# Patient Record
Sex: MALE | Race: OTHER | HISPANIC OR LATINO | URBAN - METROPOLITAN AREA
[De-identification: names, ages, dates, MRNs, and addresses within clinical notes are randomized per-mention and may not be internally consistent; named-entity substitution may affect disease eponyms.]

---

## 2017-10-17 ENCOUNTER — INPATIENT (INPATIENT)
Facility: HOSPITAL | Age: 42
LOS: 0 days | Discharge: ANOTHER HOSPITAL | DRG: 282 | End: 2017-10-18
Attending: INTERNAL MEDICINE | Admitting: INTERNAL MEDICINE
Payer: COMMERCIAL

## 2017-10-17 ENCOUNTER — TRANSCRIPTION ENCOUNTER (OUTPATIENT)
Age: 42
End: 2017-10-17

## 2017-10-17 VITALS
WEIGHT: 154.98 LBS | OXYGEN SATURATION: 99 % | TEMPERATURE: 98 F | HEIGHT: 65 IN | HEART RATE: 78 BPM | DIASTOLIC BLOOD PRESSURE: 100 MMHG | SYSTOLIC BLOOD PRESSURE: 145 MMHG | RESPIRATION RATE: 16 BRPM

## 2017-10-17 DIAGNOSIS — I21.4 NON-ST ELEVATION (NSTEMI) MYOCARDIAL INFARCTION: ICD-10-CM

## 2017-10-17 DIAGNOSIS — Z29.9 ENCOUNTER FOR PROPHYLACTIC MEASURES, UNSPECIFIED: ICD-10-CM

## 2017-10-17 DIAGNOSIS — I24.9 ACUTE ISCHEMIC HEART DISEASE, UNSPECIFIED: ICD-10-CM

## 2017-10-17 LAB
ALBUMIN SERPL ELPH-MCNC: 3.5 G/DL — SIGNIFICANT CHANGE UP (ref 3.5–5)
ALP SERPL-CCNC: 127 U/L — HIGH (ref 40–120)
ALT FLD-CCNC: 42 U/L DA — SIGNIFICANT CHANGE UP (ref 10–60)
ANION GAP SERPL CALC-SCNC: 6 MMOL/L — SIGNIFICANT CHANGE UP (ref 5–17)
AST SERPL-CCNC: 34 U/L — SIGNIFICANT CHANGE UP (ref 10–40)
BASOPHILS # BLD AUTO: 0.1 K/UL — SIGNIFICANT CHANGE UP (ref 0–0.2)
BASOPHILS NFR BLD AUTO: 0.9 % — SIGNIFICANT CHANGE UP (ref 0–2)
BILIRUB SERPL-MCNC: 0.4 MG/DL — SIGNIFICANT CHANGE UP (ref 0.2–1.2)
BUN SERPL-MCNC: 10 MG/DL — SIGNIFICANT CHANGE UP (ref 7–18)
CALCIUM SERPL-MCNC: 9.1 MG/DL — SIGNIFICANT CHANGE UP (ref 8.4–10.5)
CHLORIDE SERPL-SCNC: 103 MMOL/L — SIGNIFICANT CHANGE UP (ref 96–108)
CK SERPL-CCNC: 229 U/L — SIGNIFICANT CHANGE UP (ref 35–232)
CO2 SERPL-SCNC: 28 MMOL/L — SIGNIFICANT CHANGE UP (ref 22–31)
CREAT SERPL-MCNC: 0.77 MG/DL — SIGNIFICANT CHANGE UP (ref 0.5–1.3)
EOSINOPHIL # BLD AUTO: 0.3 K/UL — SIGNIFICANT CHANGE UP (ref 0–0.5)
EOSINOPHIL NFR BLD AUTO: 2.2 % — SIGNIFICANT CHANGE UP (ref 0–6)
GLUCOSE SERPL-MCNC: 79 MG/DL — SIGNIFICANT CHANGE UP (ref 70–99)
HCT VFR BLD CALC: 49.1 % — SIGNIFICANT CHANGE UP (ref 39–50)
HGB BLD-MCNC: 15.8 G/DL — SIGNIFICANT CHANGE UP (ref 13–17)
LYMPHOCYTES # BLD AUTO: 3.9 K/UL — HIGH (ref 1–3.3)
LYMPHOCYTES # BLD AUTO: 33.3 % — SIGNIFICANT CHANGE UP (ref 13–44)
MCHC RBC-ENTMCNC: 29.7 PG — SIGNIFICANT CHANGE UP (ref 27–34)
MCHC RBC-ENTMCNC: 32.3 GM/DL — SIGNIFICANT CHANGE UP (ref 32–36)
MCV RBC AUTO: 91.9 FL — SIGNIFICANT CHANGE UP (ref 80–100)
MONOCYTES # BLD AUTO: 0.4 K/UL — SIGNIFICANT CHANGE UP (ref 0–0.9)
MONOCYTES NFR BLD AUTO: 3.8 % — SIGNIFICANT CHANGE UP (ref 2–14)
NEUTROPHILS # BLD AUTO: 7 K/UL — SIGNIFICANT CHANGE UP (ref 1.8–7.4)
NEUTROPHILS NFR BLD AUTO: 59.8 % — SIGNIFICANT CHANGE UP (ref 43–77)
PLATELET # BLD AUTO: 309 K/UL — SIGNIFICANT CHANGE UP (ref 150–400)
POTASSIUM SERPL-MCNC: 3.6 MMOL/L — SIGNIFICANT CHANGE UP (ref 3.5–5.3)
POTASSIUM SERPL-SCNC: 3.6 MMOL/L — SIGNIFICANT CHANGE UP (ref 3.5–5.3)
PROT SERPL-MCNC: 7.9 G/DL — SIGNIFICANT CHANGE UP (ref 6–8.3)
RBC # BLD: 5.34 M/UL — SIGNIFICANT CHANGE UP (ref 4.2–5.8)
RBC # FLD: 12.5 % — SIGNIFICANT CHANGE UP (ref 10.3–14.5)
SODIUM SERPL-SCNC: 137 MMOL/L — SIGNIFICANT CHANGE UP (ref 135–145)
TROPONIN I SERPL-MCNC: 4.65 NG/ML — HIGH (ref 0–0.04)
WBC # BLD: 11.6 K/UL — HIGH (ref 3.8–10.5)
WBC # FLD AUTO: 11.6 K/UL — HIGH (ref 3.8–10.5)

## 2017-10-17 PROCEDURE — 71020: CPT | Mod: 26

## 2017-10-17 PROCEDURE — 99285 EMERGENCY DEPT VISIT HI MDM: CPT

## 2017-10-17 RX ORDER — ASPIRIN/CALCIUM CARB/MAGNESIUM 324 MG
325 TABLET ORAL ONCE
Qty: 0 | Refills: 0 | Status: COMPLETED | OUTPATIENT
Start: 2017-10-17 | End: 2017-10-17

## 2017-10-17 RX ORDER — METOPROLOL TARTRATE 50 MG
25 TABLET ORAL
Qty: 0 | Refills: 0 | Status: DISCONTINUED | OUTPATIENT
Start: 2017-10-17 | End: 2017-10-18

## 2017-10-17 RX ORDER — NITROGLYCERIN 6.5 MG
0.3 CAPSULE, EXTENDED RELEASE ORAL
Qty: 0 | Refills: 0 | Status: DISCONTINUED | OUTPATIENT
Start: 2017-10-17 | End: 2017-10-18

## 2017-10-17 RX ORDER — NICOTINE POLACRILEX 2 MG
1 GUM BUCCAL DAILY
Qty: 0 | Refills: 0 | Status: DISCONTINUED | OUTPATIENT
Start: 2017-10-17 | End: 2017-10-18

## 2017-10-17 RX ORDER — NITROGLYCERIN 6.5 MG
1 CAPSULE, EXTENDED RELEASE ORAL ONCE
Qty: 0 | Refills: 0 | Status: COMPLETED | OUTPATIENT
Start: 2017-10-17 | End: 2017-10-17

## 2017-10-17 RX ORDER — ENOXAPARIN SODIUM 100 MG/ML
60 INJECTION SUBCUTANEOUS ONCE
Qty: 0 | Refills: 0 | Status: COMPLETED | OUTPATIENT
Start: 2017-10-17 | End: 2017-10-17

## 2017-10-17 RX ORDER — CLOPIDOGREL BISULFATE 75 MG/1
75 TABLET, FILM COATED ORAL DAILY
Qty: 0 | Refills: 0 | Status: DISCONTINUED | OUTPATIENT
Start: 2017-10-17 | End: 2017-10-18

## 2017-10-17 RX ORDER — ATORVASTATIN CALCIUM 80 MG/1
40 TABLET, FILM COATED ORAL AT BEDTIME
Qty: 0 | Refills: 0 | Status: DISCONTINUED | OUTPATIENT
Start: 2017-10-17 | End: 2017-10-18

## 2017-10-17 RX ADMIN — ATORVASTATIN CALCIUM 40 MILLIGRAM(S): 80 TABLET, FILM COATED ORAL at 23:11

## 2017-10-17 RX ADMIN — ENOXAPARIN SODIUM 60 MILLIGRAM(S): 100 INJECTION SUBCUTANEOUS at 21:30

## 2017-10-17 RX ADMIN — Medication 325 MILLIGRAM(S): at 20:58

## 2017-10-17 RX ADMIN — CLOPIDOGREL BISULFATE 75 MILLIGRAM(S): 75 TABLET, FILM COATED ORAL at 23:11

## 2017-10-17 RX ADMIN — Medication 25 MILLIGRAM(S): at 23:11

## 2017-10-17 RX ADMIN — Medication 325 MILLIGRAM(S): at 21:30

## 2017-10-17 RX ADMIN — Medication 1 INCH(S): at 21:30

## 2017-10-17 NOTE — H&P ADULT - NSHPPHYSICALEXAM_GEN_ALL_CORE
GENERAL: middle aged man, no acute distress  HEAD: atraumatic, normocephalic   EYES: PERRLA, white sclera.   ENMT: nasal mucosa- Oral cavity- WNL  NECK: supple, JVD/ no JVD, thyroid- WNL  LYMPH: no palpable lymph nodes     SKIN: warm and dry  CHEST/LUNG: No Chest deformity , no chest tenderness. bilateral breath sounds,no  adventitious sounds  HEART: RRR, no m/r/g   ABDOMEN: soft, nontender, nondistended; bowel sounds.+  EXTREMITIES:  no pedal edema, no cyanosis, no clubbing.  NEURO: AA0X 3

## 2017-10-17 NOTE — H&P ADULT - ASSESSMENT
Patient is a 43 y/o M pt w/ no significant PMHx presents to ED c/o intermittent substernal chest pressure x3 days.  Patient has stable vitals in the ED, L:Abs WNL except T1 elevation to 4.6, with EKG showing TWI in Lead II, III and AVF, given ASA, plavix and full dose lovenox in the ED  Admitted for NSTEMI

## 2017-10-17 NOTE — CONSULT NOTE ADULT - PROBLEM SELECTOR RECOMMENDATION 9
- FARZANEH score: 1  - EKG shows TWI in inferior leads  - troponin 4.6; continue to trend cardiac enzymes   - s/p aspirin, plavix, full dose lovenox  - admit to telemetry  - continue plavix, atorvastatin, metoprolol  - can give morphine for pain as needed  - transfer for cardiac catheterization tomorrow

## 2017-10-17 NOTE — H&P ADULT - PROBLEM SELECTOR PLAN 1
Patient with typical chest pain, TWI in inferior leads and T1 elevation to 4.65  Admitted for NSTEMI. given ASA< plavix, lovenox  add BB, Lipitor, get TTE  check A1C, lipid profile  FARZANEH score: 1  Plan for transfer for cardiac CATH in AM  Cardio: Dr Poon

## 2017-10-17 NOTE — CONSULT NOTE ADULT - SUBJECTIVE AND OBJECTIVE BOX
Patient is a 42 year-old male who presents with a chief complaint of chest pain (17 Oct 2017 22:04)      Initial HPI on admission:  HPI:  Patient is a 42 year-old male with no PMH who presents to ED c/o intermittent substernal chest pressure x3 days. Pt states that his pain radiates to his jaw and b/l upper extremities and is associated w/ nausea and intermittet diaphoresis. Pain is substernal, pressure like, intermittent 7/10, and also happens at rest, not related to food or not started after unusual exercise. Pt reports that he has been taking Aleve to improve pain. Pt denies vomiting, SOB, or any other complaints. He is a current everyday smoker, 1PPD for last 18 years, no alcohol, FHx of MI in grand father, Surgical Hx of cholecystectomy, denies drug allergies.     BRIEF HOSPITAL COURSE: EKG shows TWI in II, III, AVF with first troponin 4.6. Patient's chest pain has improved. Given aspirin, plavix, and full dose lovenox in the ED. ICU consulted for NSTEMI. Patient is for transfer for cardiac catheterization tomorrow morning.     PAST MEDICAL & SURGICAL HISTORY:  Gallstones  No significant past surgical history    Allergies    Advil (Hives; Rash)  Motrin (Hives; Rash)      FAMILY HISTORY: MI in grandfather at age 80    Social history reviewed: current smoker, 1 pack pack per day     Review of Systems:  CONSTITUTIONAL: No fever, chills, or fatigue; DIAPHORESS, NAUSEA YESTERDAY  EYES: No eye pain, visual disturbances, or discharge  ENMT:  No difficulty hearing, tinnitus, vertigo; No sinus or throat pain  NECK: No pain or stiffness  RESPIRATORY: No cough, wheezing, chills or hemoptysis; No shortness of breath  CARDIOVASCULAR: SUBSTERNAL CHEST PAIN RADIATING TO BOTH ARMS  GASTROINTESTINAL: No abdominal or epigastric pain. No vomiting, or hematemesis; No diarrhea or constipation. No melena or hematochezia.  GENITOURINARY: No dysuria, frequency, hematuria, or incontinence  NEUROLOGICAL: No headaches, memory loss, loss of strength, numbness, or tremors  SKIN: No itching, burning, rashes, or lesions   MUSCULOSKELETAL: No joint pain or swelling; No muscle, back, or extremity pain  PSYCHIATRIC: No depression, anxiety, mood swings, or difficulty sleeping      Medications:  atorvastatin 40 milliGRAM(s) Oral at bedtime  clopidogrel Tablet 75 milliGRAM(s) Oral daily  metoprolol 25 milliGRAM(s) Oral two times a day  nicotine -  14 mG/24Hr(s) Patch 1 patch Transdermal daily  nitroglycerin     SubLingual 0.3 milliGRAM(s) SubLingual every 5 minutes PRN      Vital Signs Last 24 Hrs  T(C): 36.6 (17 Oct 2017 18:14), Max: 36.6 (17 Oct 2017 18:14)  T(F): 97.8 (17 Oct 2017 18:14), Max: 97.8 (17 Oct 2017 18:14)  HR: 78 (17 Oct 2017 18:14) (78 - 78)  BP: 145/100 (17 Oct 2017 18:14) (145/100 - 145/100)  BP(mean): --  RR: 16 (17 Oct 2017 18:14) (16 - 16)  SpO2: 99% (17 Oct 2017 18:14) (99% - 99%)      LABS:                        15.8   11.6  )-----------( 309      ( 17 Oct 2017 20:39 )             49.1     10-17    137  |  103  |  10  ----------------------------<  79  3.6   |  28  |  0.77    Ca    9.1      17 Oct 2017 20:39    TPro  7.9  /  Alb  3.5  /  TBili  0.4  /  DBili  x   /  AST  34  /  ALT  42  /  AlkPhos  127<H>  10-17      CARDIAC MARKERS ( 17 Oct 2017 20:39 )  4.650 ng/mL / x     / 229 U/L / x     / x          Physical Examination:    General: No distress; no diaphoresis    HEENT: PERRLA    PULM: Clear to auscultation bilaterally, respirations non-labored     CVS: Regular rate and rhythm, no murmurs, rubs, or gallops    ABD: Soft, nondistended, nontender, normoactive bowel sounds in all 4 quadrants     EXT: No edema, nontender    SKIN: Warm and well perfused, no rashes noted.    NEURO: Alert and oriented times 3; no focal deficits     RADIOLOGY REVIEWED:     CRITICAL CARE TIME SPENT: 35 minutes

## 2017-10-17 NOTE — ED PROVIDER NOTE - OBJECTIVE STATEMENT
43 y/o M pt w/ no significant PMHx presents to ED c/o intermittent substernal chest pressure x3 days. Pt states that his pain radiates to his neck and upper extremities and is associated w/ nausea. Pt reports that he has been taking Aleve to improve pain. Pt denies vomiting, SOB, or any other complaints. Pt's risk factors include smoking. Pt is allergic to multiple drugs as listed.

## 2017-10-17 NOTE — CONSULT NOTE ADULT - ASSESSMENT
Patient is a 42 year-old male, current smoker, who presents with typical chest pain and is admitted for NSTEMI. Patient is for cardiac catheterization tomorrow. Does not require ICU admission at this time.

## 2017-10-17 NOTE — H&P ADULT - HISTORY OF PRESENT ILLNESS
Patient is a 41 y/o M pt w/ no significant PMHx presents to ED c/o intermittent substernal chest pressure x3 days. Pt states that his pain radiates to his jaw and b/l upper extremities and is associated w/ nausea and intermittet diaphoresis. Pain is substernal, pressure like, intermittent 7/10, and also happens at rest, not related to food or not started after unusual exercise. Pt reports that he has been taking Aleve to improve pain. Pt denies vomiting, SOB, or any other complaints. He is a current everyday smoker, 1PPD for last 18 years, no alcohol, FHx of MI in grand father, Surgical Hx of cholecystectomy, denies drug allergies.

## 2017-10-18 ENCOUNTER — TRANSCRIPTION ENCOUNTER (OUTPATIENT)
Age: 42
End: 2017-10-18

## 2017-10-18 ENCOUNTER — INPATIENT (INPATIENT)
Facility: HOSPITAL | Age: 42
LOS: 0 days | Discharge: HOME | DRG: 247 | End: 2017-10-19
Attending: INTERNAL MEDICINE | Admitting: INTERNAL MEDICINE
Payer: COMMERCIAL

## 2017-10-18 VITALS
SYSTOLIC BLOOD PRESSURE: 106 MMHG | RESPIRATION RATE: 18 BRPM | DIASTOLIC BLOOD PRESSURE: 65 MMHG | OXYGEN SATURATION: 100 % | TEMPERATURE: 98 F | HEART RATE: 77 BPM

## 2017-10-18 VITALS
HEIGHT: 65 IN | WEIGHT: 158.07 LBS | TEMPERATURE: 98 F | DIASTOLIC BLOOD PRESSURE: 84 MMHG | SYSTOLIC BLOOD PRESSURE: 167 MMHG | OXYGEN SATURATION: 98 % | HEART RATE: 67 BPM | RESPIRATION RATE: 16 BRPM

## 2017-10-18 DIAGNOSIS — R07.89 OTHER CHEST PAIN: ICD-10-CM

## 2017-10-18 DIAGNOSIS — Z90.49 ACQUIRED ABSENCE OF OTHER SPECIFIED PARTS OF DIGESTIVE TRACT: Chronic | ICD-10-CM

## 2017-10-18 LAB
ALBUMIN SERPL ELPH-MCNC: 3.5 G/DL — SIGNIFICANT CHANGE UP (ref 3.5–5)
ALP SERPL-CCNC: 126 U/L — HIGH (ref 40–120)
ALT FLD-CCNC: 42 U/L DA — SIGNIFICANT CHANGE UP (ref 10–60)
ANION GAP SERPL CALC-SCNC: 5 MMOL/L — SIGNIFICANT CHANGE UP (ref 5–17)
AST SERPL-CCNC: 37 U/L — SIGNIFICANT CHANGE UP (ref 10–40)
BILIRUB DIRECT SERPL-MCNC: 0.1 MG/DL — SIGNIFICANT CHANGE UP (ref 0–0.2)
BILIRUB INDIRECT FLD-MCNC: 0.4 MG/DL — SIGNIFICANT CHANGE UP (ref 0.2–1)
BILIRUB SERPL-MCNC: 0.5 MG/DL — SIGNIFICANT CHANGE UP (ref 0.2–1.2)
BUN SERPL-MCNC: 11 MG/DL — SIGNIFICANT CHANGE UP (ref 7–18)
CALCIUM SERPL-MCNC: 8.6 MG/DL — SIGNIFICANT CHANGE UP (ref 8.4–10.5)
CHLORIDE SERPL-SCNC: 103 MMOL/L — SIGNIFICANT CHANGE UP (ref 96–108)
CHOLEST SERPL-MCNC: 201 MG/DL — HIGH (ref 10–199)
CK MB BLD-MCNC: 2.4 % — SIGNIFICANT CHANGE UP (ref 0–3.5)
CK MB BLD-MCNC: 2.8 % — SIGNIFICANT CHANGE UP (ref 0–3.5)
CK MB CFR SERPL CALC: 3.7 NG/ML — HIGH (ref 0–3.6)
CK MB CFR SERPL CALC: 5.4 NG/ML — HIGH (ref 0–3.6)
CK SERPL-CCNC: 154 U/L — SIGNIFICANT CHANGE UP (ref 35–232)
CK SERPL-CCNC: 194 U/L — SIGNIFICANT CHANGE UP (ref 35–232)
CO2 SERPL-SCNC: 30 MMOL/L — SIGNIFICANT CHANGE UP (ref 22–31)
CREAT SERPL-MCNC: 0.7 MG/DL — SIGNIFICANT CHANGE UP (ref 0.5–1.3)
GLUCOSE SERPL-MCNC: 121 MG/DL — HIGH (ref 70–99)
HBA1C BLD-MCNC: 5.4 % — SIGNIFICANT CHANGE UP (ref 4–5.6)
HCT VFR BLD CALC: 49.7 % — SIGNIFICANT CHANGE UP (ref 39–50)
HDLC SERPL-MCNC: 26 MG/DL — LOW (ref 40–125)
HGB BLD-MCNC: 16.3 G/DL — SIGNIFICANT CHANGE UP (ref 13–17)
LIPID PNL WITH DIRECT LDL SERPL: 72 MG/DL — SIGNIFICANT CHANGE UP
MAGNESIUM SERPL-MCNC: 2 MG/DL — SIGNIFICANT CHANGE UP (ref 1.6–2.6)
MCHC RBC-ENTMCNC: 29.9 PG — SIGNIFICANT CHANGE UP (ref 27–34)
MCHC RBC-ENTMCNC: 32.7 GM/DL — SIGNIFICANT CHANGE UP (ref 32–36)
MCV RBC AUTO: 91.4 FL — SIGNIFICANT CHANGE UP (ref 80–100)
PHOSPHATE SERPL-MCNC: 3.4 MG/DL — SIGNIFICANT CHANGE UP (ref 2.5–4.5)
PLATELET # BLD AUTO: 299 K/UL — SIGNIFICANT CHANGE UP (ref 150–400)
POTASSIUM SERPL-MCNC: 3.7 MMOL/L — SIGNIFICANT CHANGE UP (ref 3.5–5.3)
POTASSIUM SERPL-SCNC: 3.7 MMOL/L — SIGNIFICANT CHANGE UP (ref 3.5–5.3)
PROT SERPL-MCNC: 7.7 G/DL — SIGNIFICANT CHANGE UP (ref 6–8.3)
RBC # BLD: 5.44 M/UL — SIGNIFICANT CHANGE UP (ref 4.2–5.8)
RBC # FLD: 12.4 % — SIGNIFICANT CHANGE UP (ref 10.3–14.5)
SODIUM SERPL-SCNC: 138 MMOL/L — SIGNIFICANT CHANGE UP (ref 135–145)
TOTAL CHOLESTEROL/HDL RATIO MEASUREMENT: 7.7 RATIO — SIGNIFICANT CHANGE UP (ref 3.4–9.6)
TRIGL SERPL-MCNC: 516 MG/DL — HIGH (ref 10–149)
TROPONIN I SERPL-MCNC: 4.59 NG/ML — HIGH (ref 0–0.04)
TROPONIN I SERPL-MCNC: 5.19 NG/ML — HIGH (ref 0–0.04)
TSH SERPL-MCNC: 2.51 UU/ML — SIGNIFICANT CHANGE UP (ref 0.34–4.82)
VIT B12 SERPL-MCNC: 693 PG/ML — SIGNIFICANT CHANGE UP (ref 243–894)
WBC # BLD: 10 K/UL — SIGNIFICANT CHANGE UP (ref 3.8–10.5)
WBC # FLD AUTO: 10 K/UL — SIGNIFICANT CHANGE UP (ref 3.8–10.5)

## 2017-10-18 PROCEDURE — 84484 ASSAY OF TROPONIN QUANT: CPT

## 2017-10-18 PROCEDURE — 82550 ASSAY OF CK (CPK): CPT

## 2017-10-18 PROCEDURE — 82607 VITAMIN B-12: CPT

## 2017-10-18 PROCEDURE — 93010 ELECTROCARDIOGRAM REPORT: CPT

## 2017-10-18 PROCEDURE — 82553 CREATINE MB FRACTION: CPT

## 2017-10-18 PROCEDURE — 84100 ASSAY OF PHOSPHORUS: CPT

## 2017-10-18 PROCEDURE — 71046 X-RAY EXAM CHEST 2 VIEWS: CPT

## 2017-10-18 PROCEDURE — 93306 TTE W/DOPPLER COMPLETE: CPT

## 2017-10-18 PROCEDURE — 85027 COMPLETE CBC AUTOMATED: CPT

## 2017-10-18 PROCEDURE — 80061 LIPID PANEL: CPT

## 2017-10-18 PROCEDURE — 80053 COMPREHEN METABOLIC PANEL: CPT

## 2017-10-18 PROCEDURE — 80048 BASIC METABOLIC PNL TOTAL CA: CPT

## 2017-10-18 PROCEDURE — 84443 ASSAY THYROID STIM HORMONE: CPT

## 2017-10-18 PROCEDURE — 99285 EMERGENCY DEPT VISIT HI MDM: CPT | Mod: 25

## 2017-10-18 PROCEDURE — 83036 HEMOGLOBIN GLYCOSYLATED A1C: CPT

## 2017-10-18 PROCEDURE — 83735 ASSAY OF MAGNESIUM: CPT

## 2017-10-18 PROCEDURE — 80076 HEPATIC FUNCTION PANEL: CPT

## 2017-10-18 PROCEDURE — 93005 ELECTROCARDIOGRAM TRACING: CPT

## 2017-10-18 RX ORDER — ASPIRIN/CALCIUM CARB/MAGNESIUM 324 MG
1 TABLET ORAL
Qty: 0 | Refills: 0 | COMMUNITY
Start: 2017-10-18

## 2017-10-18 RX ORDER — ATORVASTATIN CALCIUM 80 MG/1
1 TABLET, FILM COATED ORAL
Qty: 0 | Refills: 0 | COMMUNITY
Start: 2017-10-18

## 2017-10-18 RX ORDER — ATORVASTATIN CALCIUM 80 MG/1
1 TABLET, FILM COATED ORAL
Qty: 90 | Refills: 3 | OUTPATIENT
Start: 2017-10-18 | End: 2018-10-12

## 2017-10-18 RX ORDER — NITROGLYCERIN 6.5 MG
0 CAPSULE, EXTENDED RELEASE ORAL
Qty: 0 | Refills: 0 | COMMUNITY
Start: 2017-10-18

## 2017-10-18 RX ORDER — CLOPIDOGREL BISULFATE 75 MG/1
1 TABLET, FILM COATED ORAL
Qty: 90 | Refills: 3 | OUTPATIENT
Start: 2017-10-18 | End: 2018-10-12

## 2017-10-18 RX ORDER — CLOPIDOGREL BISULFATE 75 MG/1
75 TABLET, FILM COATED ORAL DAILY
Qty: 0 | Refills: 0 | Status: DISCONTINUED | OUTPATIENT
Start: 2017-10-18 | End: 2017-10-19

## 2017-10-18 RX ORDER — NICOTINE POLACRILEX 2 MG
0 GUM BUCCAL
Qty: 0 | Refills: 0 | COMMUNITY
Start: 2017-10-18

## 2017-10-18 RX ORDER — CLOPIDOGREL BISULFATE 75 MG/1
1 TABLET, FILM COATED ORAL
Qty: 0 | Refills: 0 | COMMUNITY
Start: 2017-10-18

## 2017-10-18 RX ORDER — ATORVASTATIN CALCIUM 80 MG/1
40 TABLET, FILM COATED ORAL AT BEDTIME
Qty: 0 | Refills: 0 | Status: DISCONTINUED | OUTPATIENT
Start: 2017-10-18 | End: 2017-10-19

## 2017-10-18 RX ORDER — METOPROLOL TARTRATE 50 MG
25 TABLET ORAL
Qty: 0 | Refills: 0 | Status: DISCONTINUED | OUTPATIENT
Start: 2017-10-18 | End: 2017-10-19

## 2017-10-18 RX ORDER — ASPIRIN/CALCIUM CARB/MAGNESIUM 324 MG
81 TABLET ORAL DAILY
Qty: 0 | Refills: 0 | Status: DISCONTINUED | OUTPATIENT
Start: 2017-10-18 | End: 2017-10-19

## 2017-10-18 RX ORDER — NICOTINE POLACRILEX 2 MG
1 GUM BUCCAL DAILY
Qty: 0 | Refills: 0 | Status: DISCONTINUED | OUTPATIENT
Start: 2017-10-18 | End: 2017-10-19

## 2017-10-18 RX ORDER — METOPROLOL TARTRATE 50 MG
1 TABLET ORAL
Qty: 120 | Refills: 0 | OUTPATIENT
Start: 2017-10-18 | End: 2017-12-17

## 2017-10-18 RX ORDER — METOPROLOL TARTRATE 50 MG
1 TABLET ORAL
Qty: 0 | Refills: 0 | COMMUNITY
Start: 2017-10-18

## 2017-10-18 RX ORDER — ASPIRIN/CALCIUM CARB/MAGNESIUM 324 MG
81 TABLET ORAL DAILY
Qty: 0 | Refills: 0 | Status: DISCONTINUED | OUTPATIENT
Start: 2017-10-18 | End: 2017-10-18

## 2017-10-18 RX ADMIN — Medication 25 MILLIGRAM(S): at 17:33

## 2017-10-18 RX ADMIN — Medication 1 PATCH: at 17:33

## 2017-10-18 RX ADMIN — Medication 81 MILLIGRAM(S): at 08:52

## 2017-10-18 RX ADMIN — ATORVASTATIN CALCIUM 40 MILLIGRAM(S): 80 TABLET, FILM COATED ORAL at 21:23

## 2017-10-18 NOTE — DISCHARGE NOTE ADULT - MEDICATION SUMMARY - MEDICATIONS TO TAKE
I will START or STAY ON the medications listed below when I get home from the hospital:    aspirin 81 mg oral delayed release tablet  -- 1 tab(s) by mouth once a day  -- Indication: For To keep stent patent     atorvastatin 40 mg oral tablet  -- 1 tab(s) by mouth once a day (at bedtime)  hospital  -- Indication: For Hyperlipidemia     clopidogrel 75 mg oral tablet  -- 1 tab(s) by mouth once a day  hospital  -- Indication: For To keep stent patent     metoprolol tartrate 25 mg oral tablet  -- 1 tab(s) by mouth 2 times a day  hospital  -- Indication: For Hypertension

## 2017-10-18 NOTE — DISCHARGE NOTE ADULT - CARE PLAN
Principal Discharge DX:	NSTEMI (non-ST elevated myocardial infarction)  Goal:	cardiac CATH  Instructions for follow-up, activity and diet:	you presented with typical chest pain and were diagnosed of non ST elevation myocardial infarction and need cardiac angiogram  you are being transferred to [facility] for the same

## 2017-10-18 NOTE — CONSULT NOTE ADULT - ASSESSMENT
Pt with Hx of Smoking,family hx of CAD,hyperlipidemia here with non-stemi.  1.D/W pt and his wife agree for cardiac cath this am.  2.Smoking cessation.  3.Continue ASA,Plavix,lopressor,statin.  4.GI and DVT prophylaxis.

## 2017-10-18 NOTE — DISCHARGE NOTE ADULT - HOSPITAL COURSE
Patient is a 43 y/o M pt w/ no significant PMHx presents to ED c/o intermittent substernal chest pressure x3 days.  Patient has stable vitals in the ED, L:Abs WNL except T1 elevation to 4.6, with EKG showing TWI in Lead II, III and AVF, given ASA, plavix and full dose lovenox in the ED  Patient with typical chest pain, TWI in inferior leads and T1 elevation to 4.65  Admitted for NSTEMI. given ASA< plavix, lovenox  add BB, Lipitor, get TTE  check A1C, lipid profile  FARZANEH score: 1  Plan for transfer for cardiac CATH to [place]  Cardio: Dr Poon.   Stable for transfer as per medical attending

## 2017-10-18 NOTE — DISCHARGE NOTE ADULT - PLAN OF CARE
Pt remains chest pain free and understands post cath discharge instructions No heavy lifting, strenuous activity, bending, straining or unnecessary stair climbing  for 2 weeks. No sex for 1 week.  No driving for 2 days. You may shower 24 hours following procedure but avoid baths and swimming for 1 week. Check groin site for bleeding and/or swelling daily following procedure. Call your doctor/cardiologist immediately should it occur or if you have increased/persistent pain at the site. Follow up with your cardiologist in 1- 2 weeks. You may call [facility] at [phone] or [phone] after office hours and weekends  with any questions or concerns following your procedure. Take medications as prescribed. Your LDL cholesterol will be less than 70mg/dL Continue with your cholesterol medications. Eat a heart healthy diet that is low in saturated fats and salt, and includes whole grains, fruits, vegetables and lean protein; exercise regularly (consult with your physician or cardiologist first); maintain a heart healthy weight. Continue to follow with your primary physician or cardiologist for treatment goals, continue medication, have liver function testing every 3 months as anti lipid medications can cause liver irritation. If you smoke - quit (A resource to help you stop smoking is the [facility] – phone number [phone].).

## 2017-10-18 NOTE — DISCHARGE NOTE ADULT - NS AS DC AMI ACE CONTRA
Patient for CATH in AM/other reasons documented by Physician / Nurse Practitioner / Physician Assistant  or Pharmacist for not prescribing an ACEI AND not prescibing an ARB at discharge

## 2017-10-18 NOTE — DISCHARGE NOTE ADULT - PATIENT PORTAL LINK FT
“You can access the [employer] Patient Portal, offered by [facility], by registering with the following website: [email or web address]”

## 2017-10-18 NOTE — DISCHARGE NOTE ADULT - PLAN OF CARE
cardiac CATH you presented with typical chest pain and were diagnosed of non ST elevation myocardial infarction and need cardiac angiogram  you are being transferred to [facility] for the same

## 2017-10-18 NOTE — DISCHARGE NOTE ADULT - MEDICATION SUMMARY - MEDICATIONS TO TAKE
I will START or STAY ON the medications listed below when I get home from the hospital:    acetaminophen-oxyCODONE 325 mg-5 mg oral tablet  -- 1 tab(s) by mouth every 4 hours, As needed, Moderate Pain MDD:6  -- Indication: For Pain    nitroglycerin  -- Indication: For Angina    Allegra  --  by mouth   -- Indication: For Allergies    atorvastatin 40 mg oral tablet  -- 1 tab(s) by mouth once a day (at bedtime)  -- Indication: For HLD, ACS    clopidogrel 75 mg oral tablet  -- 1 tab(s) by mouth once a day  -- Indication: For AcS    metoprolol tartrate 25 mg oral tablet  -- 1 tab(s) by mouth 2 times a day  -- Indication: For HTN    nicotine 14 mg/24 hr transdermal film, extended release  --  by transdermal patch   -- Indication: For Smoking

## 2017-10-18 NOTE — DISCHARGE NOTE ADULT - CARE PROVIDER_API CALL
Dara Poon), Cardiology; Internal Medicine  [address] [place] [id number]  Phone: [phone]  Fax: [phone]

## 2017-10-18 NOTE — DISCHARGE NOTE ADULT - HOSPITAL COURSE
43 y/o M no significant PMHx presents to [facility] ED on 10/17 c/o intermittent substernal chest pressure 3 days PTA. Pain occurs at rest, radiating to his jaw and b/l upper extremities and is associated w/ nausea and intermittent diaphoresis. + cardiac biomarkers, ECG: TWI in inferior leads. Patient transferred to [facility] today for cardiac cath for NSTEMI/ further evaluation of obstructive CAD. Upon arrival patient denies chest pain, palpitations, SOB, PND, orthopnea.  Pt is now s/p cardiac cath DOREEN x 1 dRCA (95%). Pt tolerated the procedure well. Cardiac cath site benign. Post-procedure discharge instructions discussed and questions addressed

## 2017-10-18 NOTE — DISCHARGE NOTE ADULT - CARE PLAN
Principal Discharge DX:	CAD (coronary artery disease)  Goal:	Pt remains chest pain free and understands post cath discharge instructions  Instructions for follow-up, activity and diet:	No heavy lifting, strenuous activity, bending, straining or unnecessary stair climbing  for 2 weeks. No sex for 1 week.  No driving for 2 days. You may shower 24 hours following procedure but avoid baths and swimming for 1 week. Check groin site for bleeding and/or swelling daily following procedure. Call your doctor/cardiologist immediately should it occur or if you have increased/persistent pain at the site. Follow up with your cardiologist in 1- 2 weeks. You may call [facility] at [phone] or [phone] after office hours and weekends  with any questions or concerns following your procedure. Take medications as prescribed.  Secondary Diagnosis:	Hyperlipemia  Goal:	Your LDL cholesterol will be less than 70mg/dL  Instructions for follow-up, activity and diet:	Continue with your cholesterol medications. Eat a heart healthy diet that is low in saturated fats and salt, and includes whole grains, fruits, vegetables and lean protein; exercise regularly (consult with your physician or cardiologist first); maintain a heart healthy weight. Continue to follow with your primary physician or cardiologist for treatment goals, continue medication, have liver function testing every 3 months as anti lipid medications can cause liver irritation. If you smoke - quit (A resource to help you stop smoking is the [facility] – phone number [phone].).

## 2017-10-18 NOTE — CONSULT NOTE ADULT - SUBJECTIVE AND OBJECTIVE BOX
CHIEF COMPLAINT:Patient is a 42y old  Male who presents with a chief complaint of chest pain .      HPI:  Patient is a 41 y/o M pt w/ no significant PMHx presents to ED c/o intermittent substernal chest pressure x3 days. Pt states that his pain radiates to his jaw and b/l upper extremities and is associated w/ nausea and intermittet diaphoresis. Pain is substernal, pressure like, intermittent 7/10, and also happens at rest, not related to food or not started after unusual exercise. Pt reports that he has been taking Aleve to improve pain. Pt denies vomiting, SOB, or any other complaints. He is a current everyday smoker, 1PPD for last 18 years, no alcohol, FHx of MI in grand father, Surgical Hx of cholecystectomy, denies drug allergies. (17 Oct 2017 22:04)      PAST MEDICAL & SURGICAL HISTORY:  Gallstones        MEDICATIONS  (STANDING):  aspirin enteric coated 81 milliGRAM(s) Oral daily  atorvastatin 40 milliGRAM(s) Oral at bedtime  clopidogrel Tablet 75 milliGRAM(s) Oral daily  metoprolol 25 milliGRAM(s) Oral two times a day  nicotine -  14 mG/24Hr(s) Patch 1 patch Transdermal daily    MEDICATIONS  (PRN):  nitroglycerin     SubLingual 0.3 milliGRAM(s) SubLingual every 5 minutes PRN Chest Pain      FAMILY HISTORY:Father and multiple members with premature CAD      SOCIAL HISTORY:      [X ] Smoker-active    [ XAlcohol-social    Allergies    Advil (Hives; Rash)  Motrin (Hives; Rash)    Intolerances    	    REVIEW OF SYSTEMS:  CONSTITUTIONAL: No fever, weight loss, or fatigue  EYES: No eye pain, visual disturbances, or discharge  ENT:  No difficulty hearing, tinnitus, vertigo; No sinus or throat pain  NECK: No pain or stiffness  RESPIRATORY: No cough, wheezing, chills or hemoptysis; + Shortness of Breath  CARDIOVASCULAR:+chest pain, palpitations, passing out, dizziness, or leg swelling  GASTROINTESTINAL: No abdominal or epigastric pain. No nausea, vomiting, or hematemesis; No diarrhea or constipation. No melena or hematochezia.  GENITOURINARY: No dysuria, frequency, hematuria, or incontinence  NEUROLOGICAL: No headaches, memory loss, loss of strength, numbness, or tremors  SKIN: No itching, burning, rashes, or lesions   LYMPH Nodes: No enlarged glands  ENDOCRINE: No heat or cold intolerance; No hair loss  MUSCULOSKELETAL: No joint pain or swelling; No muscle, back, or extremity pain  PSYCHIATRIC: No depression, anxiety, mood swings, or difficulty sleeping  HEME/LYMPH: No easy bruising, or bleeding gums  ALLERGY AND IMMUNOLOGIC: No hives or eczema	      PHYSICAL EXAM:  T(C): 36.5 (10-18-17 @ 07:47), Max: 37.1 (10-18-17 @ 00:03)  HR: 77 (10-18-17 @ 07:47) (65 - 78)  BP: 106/65 (10-18-17 @ 07:47) (105/74 - 145/100)  RR: 18 (10-18-17 @ 07:47) (16 - 19)  SpO2: 100% (10-18-17 @ 07:47) (96% - 100%)    Appearance: Normal	  HEENT:   Normal oral mucosa, PERRL, EOMI	  Lymphatic: No lymphadenopathy  Cardiovascular: Normal S1 S2, No JVD, No murmurs, No edema  Respiratory: Lungs clear to auscultation	  Psychiatry: A & O x 3, Mood & affect appropriate  Gastrointestinal:  Soft, Non-tender, + BS	  Skin: No rashes, No ecchymoses, No cyanosis	  Neurologic: Non-focal  Extremities: Normal range of motion, No clubbing, cyanosis or edema  Vascular: Peripheral pulses palpable 2+ bilaterally     	    ECG:  	NSR with t wave inversion inferior leads  RADIOLOGY:  OTHER:   	  LABS:	 	    CARDIAC MARKERS:  CARDIAC MARKERS ( 18 Oct 2017 08:00 )  4.590 ng/mL / x     / 154 U/L / x     / 3.7 ng/mL  CARDIAC MARKERS ( 18 Oct 2017 01:37 )  5.190 ng/mL / x     / 194 U/L / x     / 5.4 ng/mL  CARDIAC MARKERS ( 17 Oct 2017 20:39 )  4.650 ng/mL / x     / 229 U/L / x     / x                                  16.3   10.0  )-----------( 299      ( 18 Oct 2017 01:37 )             49.7     10-18    138  |  103  |  11  ----------------------------<  121<H>  3.7   |  30  |  0.70    Ca    8.6      18 Oct 2017 01:37  Phos  3.4     10-18  Mg     2.0     10-18    TPro  7.7  /  Alb  3.5  /  TBili  0.5  /  DBili  0.1  /  AST  37  /  ALT  42  /  AlkPhos  126<H>  10-18       Lipid Profile: Cholesterol 201  LDL 72  HDL 26        TSH: Thyroid Stimulating Hormone, Serum: 2.51 uU/mL (10-18 @ 01:37)

## 2017-10-18 NOTE — H&P CARDIOLOGY - HISTORY OF PRESENT ILLNESS
41 y/o M no significant PMHx presents to [facility] ED on 10/17 c/o intermittent "pressure like" substernal chest pressure 3 days PTA radiating to his jaw and b/l upper extremities and is associated w/ nausea and intermittent diaphoresis.  Pt reports that he has been taking Aleve to improve pain. Pt denies vomiting, SOB, or any other complaints. He is a current everyday smoker, 1PPD for last 18 years, no alcohol, FHx of MI in grand father, Surgical Hx of cholecystectomy, denies drug allergies. 43 y/o M no significant PMHx presents to [facility] ED on 10/17 c/o intermittent "pressure like" substernal chest pressure 3 days PTA. Pain occurs at rest, radiating to his jaw and b/l upper extremities and is associated w/ nausea and intermittent diaphoresis.  Pt reports that he has been taking Aleve to improve pain 1 week prior to admission. + cardiac biomarkers, ECG: TWI in inferior leads. Patient transferred to [facility] today for cardiac cath for NSTEMI/ further evaluation of obstructive CAD. Upon arrival patient denies chest pain, palpitations, SOB, PND, orthopnea. 43 y/o M no significant PMHx presents to [facility] ED on 10/17 c/o intermittent substernal chest pressure 3 days PTA. Pain occurs at rest, radiating to his jaw and b/l upper extremities and is associated w/ nausea and intermittent diaphoresis.  Pt reports that he has been taking Aleve to improve pain 1 week prior to admission. + cardiac biomarkers, ECG: TWI in inferior leads. Patient transferred to [facility] today for cardiac cath for NSTEMI/ further evaluation of obstructive CAD. Upon arrival patient denies chest pain, palpitations, SOB, PND, orthopnea. 43 y/o M no significant PMHx presents to [facility] ED on 10/17 c/o intermittent substernal chest pressure 3 days PTA. Pain occurs at rest, radiating to his jaw and b/l upper extremities and is associated w/ nausea and intermittent diaphoresis. + cardiac biomarkers, ECG: TWI in inferior leads. Patient transferred to [facility] today for cardiac cath for NSTEMI/ further evaluation of obstructive CAD. Upon arrival patient denies chest pain, palpitations, SOB, PND, orthopnea.

## 2017-10-18 NOTE — DISCHARGE NOTE ADULT - CARE PROVIDER_API CALL
Nash Rajan (DO), Cardiology; Interventional Cardiology  [address] [place] [address]  Phone: [phone]  Fax: [phone]

## 2017-10-18 NOTE — DISCHARGE NOTE ADULT - NSTOBACCOHOTLINE_GEN_A_CS
[facility] Smokers Quitline ([phone]) [facility] Smokers Quitline ([phone]-DG-[phone])

## 2017-10-19 VITALS
HEART RATE: 75 BPM | TEMPERATURE: 98 F | DIASTOLIC BLOOD PRESSURE: 87 MMHG | OXYGEN SATURATION: 96 % | RESPIRATION RATE: 16 BRPM | SYSTOLIC BLOOD PRESSURE: 120 MMHG

## 2017-10-19 LAB
ANION GAP SERPL CALC-SCNC: 15 MMOL/L — SIGNIFICANT CHANGE UP (ref 5–17)
BUN SERPL-MCNC: 17 MG/DL — SIGNIFICANT CHANGE UP (ref 7–23)
CALCIUM SERPL-MCNC: 9.5 MG/DL — SIGNIFICANT CHANGE UP (ref 8.4–10.5)
CHLORIDE SERPL-SCNC: 103 MMOL/L — SIGNIFICANT CHANGE UP (ref 96–108)
CO2 SERPL-SCNC: 21 MMOL/L — LOW (ref 22–31)
CREAT SERPL-MCNC: 0.79 MG/DL — SIGNIFICANT CHANGE UP (ref 0.5–1.3)
GLUCOSE SERPL-MCNC: 121 MG/DL — HIGH (ref 70–99)
HCT VFR BLD CALC: 44.3 % — SIGNIFICANT CHANGE UP (ref 39–50)
HGB BLD-MCNC: 15.3 G/DL — SIGNIFICANT CHANGE UP (ref 13–17)
MCHC RBC-ENTMCNC: 31.5 PG — SIGNIFICANT CHANGE UP (ref 27–34)
MCHC RBC-ENTMCNC: 34.4 GM/DL — SIGNIFICANT CHANGE UP (ref 32–36)
MCV RBC AUTO: 91.4 FL — SIGNIFICANT CHANGE UP (ref 80–100)
PLATELET # BLD AUTO: 281 K/UL — SIGNIFICANT CHANGE UP (ref 150–400)
POTASSIUM SERPL-MCNC: 4.2 MMOL/L — SIGNIFICANT CHANGE UP (ref 3.5–5.3)
POTASSIUM SERPL-SCNC: 4.2 MMOL/L — SIGNIFICANT CHANGE UP (ref 3.5–5.3)
RBC # BLD: 4.85 M/UL — SIGNIFICANT CHANGE UP (ref 4.2–5.8)
RBC # FLD: 12.1 % — SIGNIFICANT CHANGE UP (ref 10.3–14.5)
SODIUM SERPL-SCNC: 139 MMOL/L — SIGNIFICANT CHANGE UP (ref 135–145)
WBC # BLD: 11.1 K/UL — HIGH (ref 3.8–10.5)
WBC # FLD AUTO: 11.1 K/UL — HIGH (ref 3.8–10.5)

## 2017-10-19 PROCEDURE — 93458 L HRT ARTERY/VENTRICLE ANGIO: CPT | Mod: 59

## 2017-10-19 PROCEDURE — C1725: CPT

## 2017-10-19 PROCEDURE — C1887: CPT

## 2017-10-19 PROCEDURE — 99152 MOD SED SAME PHYS/QHP 5/>YRS: CPT

## 2017-10-19 PROCEDURE — C9606: CPT | Mod: RC

## 2017-10-19 PROCEDURE — 93010 ELECTROCARDIOGRAM REPORT: CPT

## 2017-10-19 PROCEDURE — C1760: CPT

## 2017-10-19 PROCEDURE — C1894: CPT

## 2017-10-19 PROCEDURE — C1874: CPT

## 2017-10-19 PROCEDURE — 93005 ELECTROCARDIOGRAM TRACING: CPT

## 2017-10-19 PROCEDURE — 85027 COMPLETE CBC AUTOMATED: CPT

## 2017-10-19 PROCEDURE — C1769: CPT

## 2017-10-19 PROCEDURE — 99153 MOD SED SAME PHYS/QHP EA: CPT

## 2017-10-19 PROCEDURE — 80048 BASIC METABOLIC PNL TOTAL CA: CPT

## 2017-10-19 RX ORDER — ACETAMINOPHEN 500 MG
650 TABLET ORAL ONCE
Qty: 0 | Refills: 0 | Status: COMPLETED | OUTPATIENT
Start: 2017-10-19 | End: 2017-10-19

## 2017-10-19 RX ADMIN — Medication 81 MILLIGRAM(S): at 05:08

## 2017-10-19 RX ADMIN — Medication 25 MILLIGRAM(S): at 05:08

## 2017-10-19 RX ADMIN — Medication 650 MILLIGRAM(S): at 06:28

## 2017-10-19 RX ADMIN — Medication 650 MILLIGRAM(S): at 05:54

## 2017-10-19 RX ADMIN — CLOPIDOGREL BISULFATE 75 MILLIGRAM(S): 75 TABLET, FILM COATED ORAL at 05:08

## 2017-10-19 NOTE — PROGRESS NOTE ADULT - SUBJECTIVE AND OBJECTIVE BOX
Patient is a 42y old  Male who presents with  CAD (18 Oct 2017 19:43) now s/p cath DOREEN x 1 RCA (95%) via right femoral.           Allergies    Advil (Hives; Rash)    Intolerances        Medications:  aspirin enteric coated 81 milliGRAM(s) Oral daily  atorvastatin 40 milliGRAM(s) Oral at bedtime  clopidogrel Tablet 75 milliGRAM(s) Oral daily  metoprolol 25 milliGRAM(s) Oral two times a day  nicotine -  14 mG/24Hr(s) Patch 1 patch Transdermal daily      Vitals:  T(C): 36.7 (10-19-17 @ 05:05), Max: 36.8 (10-18-17 @ 11:18)  HR: 75 (10-19-17 @ 05:05) (57 - 77)  BP: 120/87 (10-19-17 @ 05:05) (106/65 - 162/84)  BP(mean): 110 (10-18-17 @ 11:18) (110 - 110)  RR: 16 (10-19-17 @ 05:05) (14 - 18)  SpO2: 96% (10-19-17 @ 05:05) (96% - 100%)  Wt(kg): --  Daily Height in cm: 165.1 (18 Oct 2017 11:18)    Daily Weight in k.7 (18 Oct 2017 11:18)  I&O's Summary    18 Oct 2017 07:01  -  19 Oct 2017 06:34  --------------------------------------------------------  IN: 840 mL / OUT: 0 mL / NET: 840 mL          Physical Exam:  Appearance: Normal  Eyes: PERRL, EOMI  HENT: Normal oral [place], NC/AT  Cardiovascular: S1S2, RRR, No M/R/G, no JVD, No Lower extremity edema  Procedural Access Site: Right femoral. No hematoma, Non-tender to palpation, 2+ pulse, No bruit, No Ecchymosis  Respiratory: Clear to auscultation bilaterally  Gastrointestinal: Soft, Non tender, Normal Bowel Sounds  Musculoskeletal: No clubbing, No joint deformity   Neurologic: Non-focal  Lymphatic: No lymphadenopathy  Psychiatry: AAOx3, Mood & affect appropriate  Skin: No rashes, No ecchymoses, No cyanosis    10-19    139  |  103  |  17  ----------------------------<  121<H>  4.2   |  21<L>  |  0.79    Ca    9.5      19 Oct 2017 03:15  Phos  3.4     10-18  Mg     2.0     10-18    TPro  7.7  /  Alb  3.5  /  TBili  0.5  /  DBili  0.1  /  AST  37  /  ALT  42  /  AlkPhos  126<H>  10-18      CARDIAC MARKERS ( 18 Oct 2017 08:00 )  4.590 ng/mL / x     / 154 U/L / x     / 3.7 ng/mL  CARDIAC MARKERS ( 18 Oct 2017 01:37 )  5.190 ng/mL / x     / 194 U/L / x     / 5.4 ng/mL  CARDIAC MARKERS ( 17 Oct 2017 20:39 )  4.650 ng/mL / x     / 229 U/L / x     / x            Lipid panel   Hgb A1c Hemoglobin A1C, Whole Blood: 5.4 % (10-18 @ 09:48)      Interpretation of Telemetry: NSR 60-90bmp,     ASSESSMENT/PLAN  Patient is a 42y old  Male who presents with  CAD (18 Oct 2017 19:43) now s/p cath DOREEN x 1 RCA (95%) via right femoral.   Pt tolerated the procedure well. Cardiac  cath site benign. Post-procedure discharge instructions discussed and questions addressed. Discharge home 10/19 if stable.

## 2017-10-20 DIAGNOSIS — F17.210 NICOTINE DEPENDENCE, CIGARETTES, UNCOMPLICATED: ICD-10-CM

## 2017-10-20 DIAGNOSIS — I21.4 NON-ST ELEVATION (NSTEMI) MYOCARDIAL INFARCTION: ICD-10-CM

## 2023-05-18 ENCOUNTER — TRANSCRIPTION ENCOUNTER (OUTPATIENT)
Age: 48
End: 2023-05-18

## 2023-12-14 NOTE — PATIENT PROFILE ADULT. - NS PRO ABUSE SCREEN AFRAID ANYONE YN
Detail Level: Detailed
Quality 130: Documentation Of Current Medications In The Medical Record: Current Medications Documented
Quality 431: Preventive Care And Screening: Unhealthy Alcohol Use - Screening: Patient not identified as an unhealthy alcohol user when screened for unhealthy alcohol use using a systematic screening method
Quality 226: Preventive Care And Screening: Tobacco Use: Screening And Cessation Intervention: Patient screened for tobacco use and is an ex/non-smoker
no

## 2024-03-06 ENCOUNTER — NON-APPOINTMENT (OUTPATIENT)
Age: 49
End: 2024-03-06

## 2024-03-06 PROBLEM — F17.200 NICOTINE DEPENDENCE, UNSPECIFIED, UNCOMPLICATED: Chronic | Status: ACTIVE | Noted: 2017-10-18

## 2024-03-25 ENCOUNTER — APPOINTMENT (OUTPATIENT)
Dept: HEART AND VASCULAR | Facility: CLINIC | Age: 49
End: 2024-03-25
Payer: COMMERCIAL

## 2024-03-25 ENCOUNTER — NON-APPOINTMENT (OUTPATIENT)
Age: 49
End: 2024-03-25

## 2024-03-25 VITALS
WEIGHT: 160 LBS | OXYGEN SATURATION: 96 % | HEART RATE: 93 BPM | HEIGHT: 65 IN | SYSTOLIC BLOOD PRESSURE: 143 MMHG | DIASTOLIC BLOOD PRESSURE: 80 MMHG | BODY MASS INDEX: 26.66 KG/M2 | RESPIRATION RATE: 15 BRPM

## 2024-03-25 DIAGNOSIS — E78.5 HYPERLIPIDEMIA, UNSPECIFIED: ICD-10-CM

## 2024-03-25 DIAGNOSIS — Z00.00 ENCOUNTER FOR GENERAL ADULT MEDICAL EXAMINATION W/OUT ABNORMAL FINDINGS: ICD-10-CM

## 2024-03-25 DIAGNOSIS — E66.3 OVERWEIGHT: ICD-10-CM

## 2024-03-25 DIAGNOSIS — I10 ESSENTIAL (PRIMARY) HYPERTENSION: ICD-10-CM

## 2024-03-25 DIAGNOSIS — I25.10 ATHEROSCLEROTIC HEART DISEASE OF NATIVE CORONARY ARTERY W/OUT ANGINA PECTORIS: ICD-10-CM

## 2024-03-25 DIAGNOSIS — R06.02 SHORTNESS OF BREATH: ICD-10-CM

## 2024-03-25 PROCEDURE — 99205 OFFICE O/P NEW HI 60 MIN: CPT

## 2024-03-25 PROCEDURE — 99407 BEHAV CHNG SMOKING > 10 MIN: CPT | Mod: 25

## 2024-03-25 PROCEDURE — 93000 ELECTROCARDIOGRAM COMPLETE: CPT

## 2024-03-25 NOTE — CARDIOLOGY SUMMARY
LVM on mobile phone to call back office.   LVM on home phone to call back office.    [de-identified] : 3/25/24: NSR at 84 bpm

## 2024-03-25 NOTE — HISTORY OF PRESENT ILLNESS
[FreeTextEntry1] : 48-year-old man, with a past medical history of CAD (dRCA PCI 2017, [facility]), and active cigarette smoking, presenting for establishment of cardiovascular care. Patient presently states that he is able to ambulate >10 blocks and >2 flights of stairs; but experiences dyspnea with extended ambulation. He otherwise denies CP, palpitations, dizziness/LOC, PND, orthopnea, HAs, abdominal pain, and LE swelling. He actively smoked cigarettes, though his use has decreased form 1 ppd for 15 years o now smoking 3-4 cigarettes per day.

## 2024-03-25 NOTE — ASSESSMENT
[FreeTextEntry1] : 48-year-old man, with a past medical history of CAD (dRCA PCI 2017, [facility]), and active cigarette smoking, presenting for establishment of cardiovascular care.   RAMIREZ: Patient presently states that he is able to ambulate >10 blocks and >2 flights of stairs; but experiences dyspnea with extended ambulation. He otherwise denies CP, palpitations, dizziness/LOC, PND, orthopnea, HAs, abdominal pain, and LE swelling. He actively smoked cigarettes, though his use has decreased form 1 ppd for 15 years o now smoking 3-4 cigarettes per day. Physical exam and ECG unremarkable at this time.  - TTE ordered today - exercise stress test for further evaluation of potential ischemic etiologies of RAMIREZ - patient instructed to contact me and/or seek immediate medical attention if symptoms recur or worsen  CAD: Patient with known dRCA PCI in 2017 at [facility]. Presently asymptomatic.  Physical exam and ECG unremarkable at this time.  - TTE ordered today - exercise stress test for further evaluation of potential ischemic etiologies of RAMIREZ - continue metoprolol succinate 50 mg PO QDay - continue lisinopril 20 mg PO QDay - continue atorvastatin 40mg PO QHS - continue DAPT (ASA/Plavix), discussion today about stopping one agent but patient preference to continue - patient instructed to contact me and/or seek immediate medical attention if symptoms recur or worsen  HTN: BP slightly elevated, but pastient has not taken antihypertensives as he has run out. - continue lisinopril 20 mg Po QDay - f/u BP at ensuing visits  HLD: No lipid panel available for review at this time. - continue atorvastatin 40mg PO QHS - lipid profile ordered today  Overweight: BMI 26.62 kg/m2. - lifestyle modifications (diet and exercise)  - weight loss counseling - increase aerobic exercise as tolerated to aim for approximately 30 minutes of activity 5 days a week - heart healthy diet low in sodium, sugars and saturated fats and high in fruits, vegetables and whole grains  F/u s/p TTE and EST.

## 2024-03-26 LAB
ALBUMIN SERPL ELPH-MCNC: 4.4 G/DL
ALP BLD-CCNC: 130 U/L
ALT SERPL-CCNC: 29 U/L
ANION GAP SERPL CALC-SCNC: 15 MMOL/L
AST SERPL-CCNC: 22 U/L
BILIRUB SERPL-MCNC: 0.5 MG/DL
BUN SERPL-MCNC: 10 MG/DL
CALCIUM SERPL-MCNC: 9.8 MG/DL
CHLORIDE SERPL-SCNC: 99 MMOL/L
CHOLEST SERPL-MCNC: 211 MG/DL
CO2 SERPL-SCNC: 23 MMOL/L
CREAT SERPL-MCNC: 0.74 MG/DL
EGFR: 112 ML/MIN/1.73M2
ESTIMATED AVERAGE GLUCOSE: 157 MG/DL
GLUCOSE SERPL-MCNC: 166 MG/DL
HBA1C MFR BLD HPLC: 7.1 %
HCT VFR BLD CALC: 43.2 %
HDLC SERPL-MCNC: 33 MG/DL
HGB BLD-MCNC: 14.6 G/DL
LDLC SERPL CALC-MCNC: 109 MG/DL
MCHC RBC-ENTMCNC: 28.4 PG
MCHC RBC-ENTMCNC: 33.8 GM/DL
MCV RBC AUTO: 84 FL
NONHDLC SERPL-MCNC: 179 MG/DL
PLATELET # BLD AUTO: 327 K/UL
POTASSIUM SERPL-SCNC: 4 MMOL/L
PROT SERPL-MCNC: 7.2 G/DL
RBC # BLD: 5.14 M/UL
RBC # FLD: 14.4 %
SODIUM SERPL-SCNC: 137 MMOL/L
TRIGL SERPL-MCNC: 403 MG/DL
WBC # FLD AUTO: 10.64 K/UL

## 2024-04-04 ENCOUNTER — NON-APPOINTMENT (OUTPATIENT)
Age: 49
End: 2024-04-04

## 2024-04-04 ENCOUNTER — OUTPATIENT (OUTPATIENT)
Dept: OUTPATIENT SERVICES | Facility: HOSPITAL | Age: 49
LOS: 1 days | End: 2024-04-04
Payer: COMMERCIAL

## 2024-04-04 DIAGNOSIS — Z90.49 ACQUIRED ABSENCE OF OTHER SPECIFIED PARTS OF DIGESTIVE TRACT: Chronic | ICD-10-CM

## 2024-04-04 DIAGNOSIS — R06.02 SHORTNESS OF BREATH: ICD-10-CM

## 2024-04-04 PROCEDURE — 93018 CV STRESS TEST I&R ONLY: CPT

## 2024-04-04 PROCEDURE — 93016 CV STRESS TEST SUPVJ ONLY: CPT

## 2024-04-04 PROCEDURE — 93017 CV STRESS TEST TRACING ONLY: CPT

## 2024-04-11 ENCOUNTER — APPOINTMENT (OUTPATIENT)
Dept: FAMILY MEDICINE | Facility: CLINIC | Age: 49
End: 2024-04-11
Payer: COMMERCIAL

## 2024-04-11 VITALS
HEART RATE: 84 BPM | SYSTOLIC BLOOD PRESSURE: 104 MMHG | DIASTOLIC BLOOD PRESSURE: 61 MMHG | OXYGEN SATURATION: 97 % | RESPIRATION RATE: 14 BRPM | BODY MASS INDEX: 26.66 KG/M2 | TEMPERATURE: 97.3 F | WEIGHT: 160 LBS | HEIGHT: 65 IN

## 2024-04-11 DIAGNOSIS — E11.9 TYPE 2 DIABETES MELLITUS W/OUT COMPLICATIONS: ICD-10-CM

## 2024-04-11 DIAGNOSIS — F17.200 NICOTINE DEPENDENCE, UNSPECIFIED, UNCOMPLICATED: ICD-10-CM

## 2024-04-11 DIAGNOSIS — Z80.3 FAMILY HISTORY OF MALIGNANT NEOPLASM OF BREAST: ICD-10-CM

## 2024-04-11 DIAGNOSIS — K21.9 GASTRO-ESOPHAGEAL REFLUX DISEASE W/OUT ESOPHAGITIS: ICD-10-CM

## 2024-04-11 PROCEDURE — 90471 IMMUNIZATION ADMIN: CPT

## 2024-04-11 PROCEDURE — 99204 OFFICE O/P NEW MOD 45 MIN: CPT | Mod: 25

## 2024-04-11 PROCEDURE — 36415 COLL VENOUS BLD VENIPUNCTURE: CPT

## 2024-04-11 PROCEDURE — 90715 TDAP VACCINE 7 YRS/> IM: CPT

## 2024-04-11 RX ORDER — OMEPRAZOLE 20 MG/1
20 TABLET, DELAYED RELEASE ORAL DAILY
Qty: 90 | Refills: 0 | Status: ACTIVE | COMMUNITY
Start: 2024-04-11 | End: 1900-01-01

## 2024-04-11 RX ORDER — FAMOTIDINE 20 MG/1
20 TABLET, FILM COATED ORAL
Qty: 90 | Refills: 0 | Status: ACTIVE | COMMUNITY
Start: 2024-04-11 | End: 1900-01-01

## 2024-04-11 NOTE — HISTORY OF PRESENT ILLNESS
[de-identified] : 48 year old male with history of DM, CAD s/p 1 vessel stent presents to establish care. He reports heartburn symptoms several times a week. He tries to avoid trigger foods but drinks plenty of coffee. He works nights as a .

## 2024-04-11 NOTE — PLAN
[FreeTextEntry1] : Smoking cessation discussed; Nicotine patch given. CAD- continue Atorvastatin, Plavix, ASA, Metoprolol HTN- continue Lisinopril GERD- trial of Omeprazole 20mg daily, Pepcid qhs prn; rec diet and lifestyle modification HCM- screening colonoscopy, Tdap

## 2024-04-11 NOTE — HEALTH RISK ASSESSMENT
[Good] : ~his/her~  mood as  good [Yes] : Yes [2 - 3 times a week (3 pts)] : 2 - 3  times a week (3 points) [1 or 2 (0 pts)] : 1 or 2 (0 points) [Never (0 pts)] : Never (0 points) [0] : 2) Feeling down, depressed, or hopeless: Not at all (0) [PHQ-2 Negative - No further assessment needed] : PHQ-2 Negative - No further assessment needed [HIV Test offered] : HIV Test offered [Hepatitis C test offered] : Hepatitis C test offered [With Family] : lives with family [# of Members in Household ___] :  household currently consist of [unfilled] member(s) [Employed] : employed [] :  [Sexually Active] : sexually active [Feels Safe at Home] : Feels safe at home [Current] : Current [20 or more] : 20 or more [[id number]] : 0 [Change in mental status noted] : No change in mental status noted [High Risk Behavior] : no high risk behavior [Reports changes in hearing] : Reports no changes in hearing [Reports changes in vision] : Reports no changes in vision [Reports changes in dental health] : Reports no changes in dental health [Travel to Developing Areas] : does not  travel to developing areas [TB Exposure] : is not being exposed to tuberculosis [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

## 2024-04-24 ENCOUNTER — APPOINTMENT (OUTPATIENT)
Dept: HEART AND VASCULAR | Facility: CLINIC | Age: 49
End: 2024-04-24

## 2024-05-24 NOTE — ED ADULT NURSE NOTE - NS PRO PASSIVE SMOKE EXP
"[employer] message sent to patient to convey MRI results. No new recommendations per Dr. Petty at this time. Patient scheduled for f/u next wk, 5/30/2024.   
----- Message from Nayely Petty MD sent at 5/23/2024 11:36 PM CDT -----  Pls tell patient brain MRI normal. Thanks.  
Yes...

## 2024-06-12 RX ORDER — ATORVASTATIN CALCIUM 40 MG/1
40 TABLET, FILM COATED ORAL
Qty: 1 | Refills: 1 | Status: ACTIVE | COMMUNITY
Start: 2024-03-25 | End: 1900-01-01

## 2024-06-12 RX ORDER — NICOTINE 21 MG/24HR
21 PATCH, TRANSDERMAL 24 HOURS TRANSDERMAL DAILY
Qty: 90 | Refills: 1 | Status: ACTIVE | COMMUNITY
Start: 2024-04-11 | End: 1900-01-01

## 2024-06-12 RX ORDER — METFORMIN HYDROCHLORIDE 500 MG/1
500 TABLET, COATED ORAL TWICE DAILY
Qty: 180 | Refills: 1 | Status: ACTIVE | COMMUNITY
Start: 2024-03-25 | End: 1900-01-01

## 2024-06-24 RX ORDER — CLOPIDOGREL BISULFATE 75 MG/1
75 TABLET, FILM COATED ORAL DAILY
Qty: 90 | Refills: 1 | Status: ACTIVE | COMMUNITY
Start: 2024-03-25 | End: 1900-01-01

## 2024-06-24 RX ORDER — METOPROLOL SUCCINATE 50 MG/1
50 TABLET, EXTENDED RELEASE ORAL
Qty: 180 | Refills: 1 | Status: ACTIVE | COMMUNITY
Start: 2024-03-25 | End: 1900-01-01

## 2024-06-24 RX ORDER — LISINOPRIL 20 MG/1
20 TABLET ORAL DAILY
Qty: 3 | Refills: 1 | Status: ACTIVE | COMMUNITY
Start: 2024-03-25 | End: 1900-01-01

## 2024-07-24 ENCOUNTER — APPOINTMENT (OUTPATIENT)
Age: 49
End: 2024-07-24
Payer: COMMERCIAL

## 2024-07-24 ENCOUNTER — NON-APPOINTMENT (OUTPATIENT)
Age: 49
End: 2024-07-24

## 2024-07-24 VITALS
HEIGHT: 65 IN | HEART RATE: 78 BPM | DIASTOLIC BLOOD PRESSURE: 81 MMHG | OXYGEN SATURATION: 95 % | TEMPERATURE: 98.2 F | BODY MASS INDEX: 28.82 KG/M2 | RESPIRATION RATE: 14 BRPM | SYSTOLIC BLOOD PRESSURE: 133 MMHG | WEIGHT: 173 LBS

## 2024-07-24 DIAGNOSIS — I10 ESSENTIAL (PRIMARY) HYPERTENSION: ICD-10-CM

## 2024-07-24 DIAGNOSIS — E66.3 OVERWEIGHT: ICD-10-CM

## 2024-07-24 DIAGNOSIS — E78.5 HYPERLIPIDEMIA, UNSPECIFIED: ICD-10-CM

## 2024-07-24 DIAGNOSIS — I25.10 ATHEROSCLEROTIC HEART DISEASE OF NATIVE CORONARY ARTERY W/OUT ANGINA PECTORIS: ICD-10-CM

## 2024-07-24 DIAGNOSIS — R06.02 SHORTNESS OF BREATH: ICD-10-CM

## 2024-07-24 DIAGNOSIS — K21.9 GASTRO-ESOPHAGEAL REFLUX DISEASE W/OUT ESOPHAGITIS: ICD-10-CM

## 2024-07-24 PROCEDURE — 99214 OFFICE O/P EST MOD 30 MIN: CPT | Mod: 25

## 2024-07-24 PROCEDURE — 93000 ELECTROCARDIOGRAM COMPLETE: CPT

## 2024-07-24 NOTE — ASSESSMENT
[FreeTextEntry1] : 49-year-old man, with a past medical history of CAD (dRCA PCI 2017, [facility]), and active cigarette smoking, presenting for follow-up.   RAMIREZ: Patient presently states that he is able to ambulate >10 blocks and >2 flights of stairs; but experiences dyspnea with extended ambulation. He otherwise denies CP, palpitations, dizziness/LOC, PND, orthopnea, HAs, abdominal pain, and LE swelling. He actively smoked cigarettes, though his use has decreased form 1 ppd for 15 years o now smoking 3-4 cigarettes per day. Physical exam and ECG unremarkable at this time. - TTE pending - exercise stress test within normal limits (see scanned document)  - patient instructed to contact me and/or seek immediate medical attention if symptoms recur or worsen  CAD: Patient with known dRCA PCI in 2017 at [facility]. Presently asymptomatic. Physical exam and ECG unremarkable at this time. - TTE pending - exercise stress test within normal limits (see scanned document)  - continue metoprolol succinate 50 mg PO QDay - continue lisinopril 20 mg PO QDay - continue atorvastatin 40mg PO QHS - continue DAPT (ASA/Plavix), discussion today about stopping one agent but patient preference to continue - patient instructed to contact me and/or seek immediate medical attention if symptoms recur or worsen  HTN: BP slightly elevated, but patient has not taken antihypertensives yet today. he is a  and works overnight, but will now start taking medications upon awakening.  - continue lisinopril 20 mg PO QDay - f/u BP at ensuing visits  HLD: No lipid panel available for review at this time. - continue atorvastatin 40mg PO QHS - lipid profile ordered today; and discussion with patient that if LDL is still >70, will increase atorvastatin to 80 mg PO QHS  Overweight: BMI 28.79, up from 26.62 kg/m2. - lifestyle modifications (diet and exercise) - weight loss counseling - increase aerobic exercise as tolerated to aim for approximately 30 minutes of activity 5 days a week - heart healthy diet low in sodium, sugars and saturated fats and high in fruits, vegetables and whole grains  F/u s/p TTE.

## 2024-07-24 NOTE — PHYSICAL EXAM
[No Acute Distress] : no acute distress [Normal Venous Pressure] : normal venous pressure [No Carotid Bruit] : no carotid bruit [Normal S1, S2] : normal S1, S2 [No Murmur] : no murmur [No Rub] : no rub [No Gallop] : no gallop [Clear Lung Fields] : clear lung fields [Good Air Entry] : good air entry [No Respiratory Distress] : no respiratory distress  [Soft] : abdomen soft [Non Tender] : non-tender [No Masses/organomegaly] : no masses/organomegaly [Normal Bowel Sounds] : normal bowel sounds [Normal Gait] : normal gait [No Edema] : no edema [No Cyanosis] : no cyanosis [No Clubbing] : no clubbing [No Varicosities] : no varicosities [Moves all extremities] : moves all extremities [No Focal Deficits] : no focal deficits [Normal Speech] : normal speech [Alert and Oriented] : alert and oriented [Normal memory] : normal memory

## 2024-07-24 NOTE — CARDIOLOGY SUMMARY
[de-identified] : 7/24/24: NSR at 69 bpm 3/25/24: NSR at 84 bpm [de-identified] : EST 4/4/24: Normal exercise stress test [de-identified] : pending.

## 2024-07-29 LAB
ALBUMIN SERPL ELPH-MCNC: 4.4 G/DL
ALP BLD-CCNC: 119 U/L
ALT SERPL-CCNC: 24 U/L
ANION GAP SERPL CALC-SCNC: 14 MMOL/L
AST SERPL-CCNC: 23 U/L
BILIRUB SERPL-MCNC: 0.4 MG/DL
BUN SERPL-MCNC: 14 MG/DL
CALCIUM SERPL-MCNC: 9.7 MG/DL
CHLORIDE SERPL-SCNC: 102 MMOL/L
CHOLEST SERPL-MCNC: 181 MG/DL
CO2 SERPL-SCNC: 22 MMOL/L
CREAT SERPL-MCNC: 0.71 MG/DL
EGFR: 113 ML/MIN/1.73M2
ESTIMATED AVERAGE GLUCOSE: 140 MG/DL
GLUCOSE SERPL-MCNC: 117 MG/DL
HBA1C MFR BLD HPLC: 6.5 %
HDLC SERPL-MCNC: 32 MG/DL
LDLC SERPL CALC-MCNC: 98 MG/DL
NONHDLC SERPL-MCNC: 149 MG/DL
POTASSIUM SERPL-SCNC: 4.2 MMOL/L
PROT SERPL-MCNC: 7.3 G/DL
SODIUM SERPL-SCNC: 138 MMOL/L
TRIGL SERPL-MCNC: 301 MG/DL

## 2025-07-07 ENCOUNTER — NON-APPOINTMENT (OUTPATIENT)
Age: 50
End: 2025-07-07

## 2025-07-09 ENCOUNTER — APPOINTMENT (OUTPATIENT)
Age: 50
End: 2025-07-09
Payer: COMMERCIAL

## 2025-07-09 ENCOUNTER — NON-APPOINTMENT (OUTPATIENT)
Age: 50
End: 2025-07-09

## 2025-07-09 VITALS
BODY MASS INDEX: 28.82 KG/M2 | DIASTOLIC BLOOD PRESSURE: 87 MMHG | WEIGHT: 173 LBS | SYSTOLIC BLOOD PRESSURE: 130 MMHG | HEIGHT: 65 IN | TEMPERATURE: 98.3 F | HEART RATE: 92 BPM | RESPIRATION RATE: 14 BRPM | OXYGEN SATURATION: 96 %

## 2025-07-09 PROCEDURE — 99214 OFFICE O/P EST MOD 30 MIN: CPT | Mod: 25

## 2025-07-09 PROCEDURE — 93000 ELECTROCARDIOGRAM COMPLETE: CPT

## 2025-07-14 LAB
ALBUMIN SERPL ELPH-MCNC: 4.6 G/DL
ALP BLD-CCNC: 125 U/L
ALT SERPL-CCNC: 39 U/L
ANION GAP SERPL CALC-SCNC: 15 MMOL/L
AST SERPL-CCNC: 32 U/L
BILIRUB SERPL-MCNC: 0.6 MG/DL
BUN SERPL-MCNC: 12 MG/DL
CALCIUM SERPL-MCNC: 9.6 MG/DL
CHLORIDE SERPL-SCNC: 103 MMOL/L
CHOLEST SERPL-MCNC: 175 MG/DL
CO2 SERPL-SCNC: 21 MMOL/L
CREAT SERPL-MCNC: 0.64 MG/DL
EGFRCR SERPLBLD CKD-EPI 2021: 116 ML/MIN/1.73M2
ESTIMATED AVERAGE GLUCOSE: 140 MG/DL
GLUCOSE SERPL-MCNC: 99 MG/DL
HBA1C MFR BLD HPLC: 6.5 %
HCT VFR BLD CALC: 46.2 %
HDLC SERPL-MCNC: 29 MG/DL
HGB BLD-MCNC: 14.9 G/DL
LDLC SERPL-MCNC: 82 MG/DL
MCHC RBC-ENTMCNC: 28.4 PG
MCHC RBC-ENTMCNC: 32.3 G/DL
MCV RBC AUTO: 88 FL
NONHDLC SERPL-MCNC: 147 MG/DL
PLATELET # BLD AUTO: 328 K/UL
POTASSIUM SERPL-SCNC: 4.1 MMOL/L
PROT SERPL-MCNC: 7.5 G/DL
RBC # BLD: 5.25 M/UL
RBC # FLD: 13.9 %
SODIUM SERPL-SCNC: 138 MMOL/L
TRIGL SERPL-MCNC: 399 MG/DL
WBC # FLD AUTO: 8.4 K/UL

## 2025-09-05 ENCOUNTER — TRANSCRIPTION ENCOUNTER (OUTPATIENT)
Age: 50
End: 2025-09-05